# Patient Record
Sex: FEMALE | Race: ASIAN | NOT HISPANIC OR LATINO
[De-identification: names, ages, dates, MRNs, and addresses within clinical notes are randomized per-mention and may not be internally consistent; named-entity substitution may affect disease eponyms.]

---

## 2019-04-15 PROBLEM — Z00.00 ENCOUNTER FOR PREVENTIVE HEALTH EXAMINATION: Status: ACTIVE | Noted: 2019-04-15

## 2019-04-26 ENCOUNTER — APPOINTMENT (OUTPATIENT)
Dept: COLORECTAL SURGERY | Facility: CLINIC | Age: 59
End: 2019-04-26
Payer: COMMERCIAL

## 2019-04-26 VITALS
TEMPERATURE: 97.6 F | HEIGHT: 61 IN | BODY MASS INDEX: 20.77 KG/M2 | SYSTOLIC BLOOD PRESSURE: 108 MMHG | HEART RATE: 71 BPM | DIASTOLIC BLOOD PRESSURE: 72 MMHG | WEIGHT: 110 LBS

## 2019-04-26 DIAGNOSIS — Z85.3 PERSONAL HISTORY OF MALIGNANT NEOPLASM OF BREAST: ICD-10-CM

## 2019-04-26 DIAGNOSIS — C50.912 MALIGNANT NEOPLASM OF UNSPECIFIED SITE OF LEFT FEMALE BREAST: ICD-10-CM

## 2019-04-26 PROCEDURE — 99213 OFFICE O/P EST LOW 20 MIN: CPT | Mod: 25

## 2019-04-26 PROCEDURE — 46221 LIGATION OF HEMORRHOID(S): CPT

## 2019-05-01 NOTE — CONSULT LETTER
[Dear  ___] : Dear  [unfilled], [Consult Letter:] : I had the pleasure of evaluating your patient, [unfilled]. [( Thank you for referring [unfilled] for consultation for _____ )] : Thank you for referring [unfilled] for consultation for [unfilled] [Please see my note below.] : Please see my note below. [Consult Closing:] : Thank you very much for allowing me to participate in the care of this patient.  If you have any questions, please do not hesitate to contact me. [Sincerely,] : Sincerely, [FreeTextEntry2] : CLARIBEL OTT\par [address] \[place] [address] [FreeTextEntry3] : JUAN CARLOS MCGOVERN MD

## 2019-05-01 NOTE — HISTORY OF PRESENT ILLNESS
[FreeTextEntry1] : 57 yo F presents for f/u hemorrhoids\par \par PMH L breast CA, s/p chemoradiation 09/2010 and mastectomy 03/2011\par \par Reports hx of hemorrhoids since teenager, seen 1 year ago at [facility],  advised to make dietary changes as per patient\par \par Pt reports hemorrhoids are larger since last visit, "always sticking out"\par (+) occasional itching and BRBPR noted on TP with some BMs\par Denies pain, diarrhea or constipation\par \par BH: daily, takes 20-30 min to pass stool. Denies straining\par Reports adequate dietary fiber and water intake\par No use of stool softeners or fiber supplements\par Colonoscopy completed 3/25/2018, notable for irregular fold in sigmoid, biopsy taken, normal as per patient\par Denies FMH colorectal CA\par Denies ASA/NSAIDs in last 7 days

## 2019-05-01 NOTE — PHYSICAL EXAM
[Normal] : was normal [None] : there was no rectal mass  [Excoriation] : no perianal excoriation [de-identified] : Circumferential external hemorrhoids with mild internal prolapse. [FreeTextEntry1] : A lighted anoscope was passed into the anal canal and the entire anal mucosal surface was inspected..  THe findings revealed moderate internal hemorrhoids. No masses or lesions were identified.\par \par The risks and benefits of rubber band ligation were discussed with the patient including but not limited to bleeding, pain, infection, and the need for future procedures. The anoscope was placed and rubber band ligation was performed of the internal hemorrhoids- left lateral and RPQ  with good result. The patient tolerated the procedure well. Appropriate postprocedure instructions were given to the patient.\par

## 2021-05-28 ENCOUNTER — NON-APPOINTMENT (OUTPATIENT)
Age: 61
End: 2021-05-28

## 2021-06-07 ENCOUNTER — APPOINTMENT (OUTPATIENT)
Dept: COLORECTAL SURGERY | Facility: CLINIC | Age: 61
End: 2021-06-07
Payer: COMMERCIAL

## 2021-06-07 VITALS
HEIGHT: 61 IN | HEART RATE: 77 BPM | WEIGHT: 105 LBS | BODY MASS INDEX: 19.83 KG/M2 | SYSTOLIC BLOOD PRESSURE: 127 MMHG | DIASTOLIC BLOOD PRESSURE: 81 MMHG | TEMPERATURE: 97.7 F

## 2021-06-07 PROCEDURE — 99072 ADDL SUPL MATRL&STAF TM PHE: CPT

## 2021-06-07 PROCEDURE — 99213 OFFICE O/P EST LOW 20 MIN: CPT | Mod: 25

## 2021-06-07 PROCEDURE — 46221 LIGATION OF HEMORRHOID(S): CPT

## 2021-06-07 NOTE — HISTORY OF PRESENT ILLNESS
[FreeTextEntry1] : 59 y/o English/Mandarin/Cantonese speaking F presents for f/u hemorrhoids \par PMH breast CA, s/p chemoradiation 09/2010 and mastectomy 03/2011\par \par Last seen in the office on 4/26/19. Circumferential external hemorrhoids with mild internal prolapse noted on exam. RBL of left lateral and RPQ performed. Reports improvement in symptoms for several months\par \par Reports occasional pain when sitting, states it feels like she is sitting on a bump. Admits to occasional BRBPR on the TP and rarely in the bowl . Reports she notices diet affects symptoms\par Denies itching\par BH: Daily\par Denies constipation, diarrhea or straining\par No use of stool softeners, fiber supplements or laxatives\par Reports adequate dietary fiber intake. Currently drinking 8 + cups of water daily  \par No use of OTC hemorrhoid medications or prescription medications. using A&D ointment PRN with mild improvement in symptoms\par Colonoscopy completed 3/25/2018, notable for irregular fold in sigmoid, biopsy taken, normal as per patient\par No ASA/NSAIDs last 7 days

## 2021-06-07 NOTE — PHYSICAL EXAM
[Excoriation] : no perianal excoriation [Normal] : was normal [None] : there was no rectal mass  [de-identified] : Circumferential external hemorrhoids with mild internal prolapse. [FreeTextEntry1] : Medical assistant was present for the entire exam.\par \par Anoscopy was performed for evaluation of the patients rectal bleeding  history .\par The risks, benefits and alternatives were reviewed.\par \par A lighted anoscope was passed into the anal canal and the entire anal mucosal surface was inspected..  \par The findings revealed moderate internal hemorrhoids.\par  No masses or lesions were identified.\par \par The risks and benefits of rubber band ligation were discussed with the patient including but not limited to bleeding, pain, infection, and the need for future procedures. The anoscope was placed and rubber band ligation was performed of the internal hemorrhoids - RPQ and left lateral  with good result. The patient tolerated the procedure well. Appropriate postprocedure instructions were given to the patient.\par

## 2021-08-23 ENCOUNTER — APPOINTMENT (OUTPATIENT)
Dept: COLORECTAL SURGERY | Facility: CLINIC | Age: 61
End: 2021-08-23
Payer: COMMERCIAL

## 2021-08-30 ENCOUNTER — APPOINTMENT (OUTPATIENT)
Dept: COLORECTAL SURGERY | Facility: CLINIC | Age: 61
End: 2021-08-30
Payer: COMMERCIAL

## 2021-08-30 VITALS
DIASTOLIC BLOOD PRESSURE: 81 MMHG | WEIGHT: 103 LBS | SYSTOLIC BLOOD PRESSURE: 125 MMHG | TEMPERATURE: 97.7 F | HEART RATE: 66 BPM | HEIGHT: 61 IN | BODY MASS INDEX: 19.45 KG/M2

## 2021-08-30 DIAGNOSIS — K64.8 OTHER HEMORRHOIDS: ICD-10-CM

## 2021-08-30 PROCEDURE — 99213 OFFICE O/P EST LOW 20 MIN: CPT | Mod: 25

## 2021-08-30 PROCEDURE — 46600 DIAGNOSTIC ANOSCOPY SPX: CPT

## 2021-08-30 NOTE — ASSESSMENT
[FreeTextEntry1] : Currently asymptomatic.\par \par Recommend continued high fiber diet. Avoid straining without bowel movements. Follow up as needed symptoms are recurrent

## 2021-08-30 NOTE — HISTORY OF PRESENT ILLNESS
[FreeTextEntry1] : 61 y/o English/Cantonese speaking F presents for f/u hemorrhoids\par PMH breast CA, s/p chemoradiation 09/2010 and mastectomy 03/2011\par h/o RBL to LL and RPQ 04/2019\par \par Last seen in the office on 6/7/21. Circumferential external hemorrhoids with mild internal prolapse and moderate internal hemorrhoids noted on exam. RBL of RPQ and LL hemorrhoids performed. \par Pt reports hemorrhoid symptoms improved after banding, but there's an anterior area that is always outside and she wants it addressed\par Denies pain, itching or burning\par Pt reports BMs have been improved since increasing intake of fruits and vegetables\par h/o one episode of BRBPR noted in toilet bowl after eating spicy foods\par BH: daily\par Denies constipation, diarrhea or straining. \par Reports adequate dietary fiber and she continues drinking 8+cups water daily\par Denies stool softeners, fiber supplements or laxatives\par Denies ASA/NSAID use in the last week\par \par Colonoscopy completed 3/25/2018, notable for irregular fold in sigmoid, biopsy taken, normal as per patient\par

## 2021-08-30 NOTE — PHYSICAL EXAM
[Excoriation] : no perianal excoriation [Normal] : was normal [None] : there was no rectal mass  [de-identified] : Circumferential external hemorrhoids without  internal prolapse. [FreeTextEntry1] : Medical assistant was present for the entire exam.\par \par Anoscopy was performed for evaluation of the patients rectal bleeding  history .\par The risks, benefits and alternatives were reviewed.\par \par A lighted anoscope was passed into the anal canal and the entire anal mucosal surface was inspected..  \par The findings revealed minimal internal hemorrhoids.\par No masses or lesions were identified.\par \par

## 2023-10-30 ENCOUNTER — NON-APPOINTMENT (OUTPATIENT)
Age: 63
End: 2023-10-30

## 2023-10-30 ENCOUNTER — APPOINTMENT (OUTPATIENT)
Dept: COLORECTAL SURGERY | Facility: CLINIC | Age: 63
End: 2023-10-30
Payer: MEDICARE

## 2023-10-30 VITALS
SYSTOLIC BLOOD PRESSURE: 121 MMHG | WEIGHT: 108 LBS | DIASTOLIC BLOOD PRESSURE: 80 MMHG | BODY MASS INDEX: 20.39 KG/M2 | HEART RATE: 67 BPM | HEIGHT: 61 IN | TEMPERATURE: 97.7 F

## 2023-10-30 DIAGNOSIS — K64.8 OTHER HEMORRHOIDS: ICD-10-CM

## 2023-10-30 PROCEDURE — 46606 ANOSCOPY AND BIOPSY: CPT

## 2023-10-30 PROCEDURE — 99213 OFFICE O/P EST LOW 20 MIN: CPT | Mod: 25

## 2023-10-30 RX ORDER — HYDROCORTISONE ACETATE AND PRAMOXINE HYDROCHLORIDE 25; 10 MG/G; MG/G
2.5-1 CREAM TOPICAL
Qty: 2 | Refills: 2 | Status: ACTIVE | COMMUNITY
Start: 2023-10-30 | End: 1900-01-01